# Patient Record
Sex: MALE | Race: AMERICAN INDIAN OR ALASKA NATIVE | NOT HISPANIC OR LATINO | URBAN - METROPOLITAN AREA
[De-identification: names, ages, dates, MRNs, and addresses within clinical notes are randomized per-mention and may not be internally consistent; named-entity substitution may affect disease eponyms.]

---

## 2020-01-06 ENCOUNTER — OUTPATIENT (OUTPATIENT)
Dept: OUTPATIENT SERVICES | Facility: HOSPITAL | Age: 80
LOS: 1 days | End: 2020-01-06
Payer: MEDICAID

## 2020-01-06 DIAGNOSIS — R63.4 ABNORMAL WEIGHT LOSS: ICD-10-CM

## 2020-01-06 DIAGNOSIS — R63.0 ANOREXIA: ICD-10-CM

## 2020-01-06 PROCEDURE — 74240 X-RAY XM UPR GI TRC 1CNTRST: CPT

## 2020-01-06 PROCEDURE — 74240 X-RAY XM UPR GI TRC 1CNTRST: CPT | Mod: 26

## 2020-01-07 DIAGNOSIS — R63.0 ANOREXIA: ICD-10-CM

## 2020-01-07 DIAGNOSIS — R63.4 ABNORMAL WEIGHT LOSS: ICD-10-CM

## 2020-02-14 ENCOUNTER — APPOINTMENT (OUTPATIENT)
Dept: RADIOLOGY | Facility: CLINIC | Age: 80
End: 2020-02-14
Payer: MEDICAID

## 2020-02-14 ENCOUNTER — OUTPATIENT (OUTPATIENT)
Dept: OUTPATIENT SERVICES | Facility: HOSPITAL | Age: 80
LOS: 1 days | End: 2020-02-14
Payer: MEDICAID

## 2020-02-14 ENCOUNTER — APPOINTMENT (OUTPATIENT)
Dept: ULTRASOUND IMAGING | Facility: CLINIC | Age: 80
End: 2020-02-14
Payer: MEDICAID

## 2020-02-14 DIAGNOSIS — Z00.8 ENCOUNTER FOR OTHER GENERAL EXAMINATION: ICD-10-CM

## 2020-02-14 PROCEDURE — 76700 US EXAM ABDOM COMPLETE: CPT

## 2020-02-14 PROCEDURE — 76700 US EXAM ABDOM COMPLETE: CPT | Mod: 26

## 2020-02-14 PROCEDURE — 71046 X-RAY EXAM CHEST 2 VIEWS: CPT | Mod: 26

## 2020-02-14 PROCEDURE — 71046 X-RAY EXAM CHEST 2 VIEWS: CPT

## 2020-02-20 ENCOUNTER — OUTPATIENT (OUTPATIENT)
Dept: OUTPATIENT SERVICES | Facility: HOSPITAL | Age: 80
LOS: 1 days | End: 2020-02-20
Payer: MEDICAID

## 2020-02-20 ENCOUNTER — APPOINTMENT (OUTPATIENT)
Dept: CT IMAGING | Facility: CLINIC | Age: 80
End: 2020-02-20
Payer: MEDICAID

## 2020-02-20 DIAGNOSIS — Z00.8 ENCOUNTER FOR OTHER GENERAL EXAMINATION: ICD-10-CM

## 2020-02-20 DIAGNOSIS — R63.4 ABNORMAL WEIGHT LOSS: ICD-10-CM

## 2020-02-20 DIAGNOSIS — K86.89 OTHER SPECIFIED DISEASES OF PANCREAS: ICD-10-CM

## 2020-02-20 DIAGNOSIS — D49.0 NEOPLASM OF UNSPECIFIED BEHAVIOR OF DIGESTIVE SYSTEM: ICD-10-CM

## 2020-02-20 PROCEDURE — 71260 CT THORAX DX C+: CPT | Mod: 26

## 2020-02-20 PROCEDURE — 74177 CT ABD & PELVIS W/CONTRAST: CPT | Mod: 26

## 2020-02-20 PROCEDURE — 71260 CT THORAX DX C+: CPT

## 2020-02-20 PROCEDURE — 74177 CT ABD & PELVIS W/CONTRAST: CPT

## 2020-02-20 PROCEDURE — 82565 ASSAY OF CREATININE: CPT

## 2020-02-25 ENCOUNTER — APPOINTMENT (OUTPATIENT)
Dept: SURGICAL ONCOLOGY | Facility: CLINIC | Age: 80
End: 2020-02-25
Payer: MEDICAID

## 2020-02-25 VITALS
WEIGHT: 87 LBS | SYSTOLIC BLOOD PRESSURE: 156 MMHG | HEART RATE: 72 BPM | BODY MASS INDEX: 15.41 KG/M2 | HEIGHT: 63 IN | DIASTOLIC BLOOD PRESSURE: 89 MMHG | RESPIRATION RATE: 15 BRPM

## 2020-02-25 DIAGNOSIS — Z87.898 PERSONAL HISTORY OF OTHER SPECIFIED CONDITIONS: ICD-10-CM

## 2020-02-25 DIAGNOSIS — Z86.73 PERSONAL HISTORY OF TRANSIENT ISCHEMIC ATTACK (TIA), AND CEREBRAL INFARCTION W/OUT RESIDUAL DEFICITS: ICD-10-CM

## 2020-02-25 DIAGNOSIS — Z86.39 PERSONAL HISTORY OF OTHER ENDOCRINE, NUTRITIONAL AND METABOLIC DISEASE: ICD-10-CM

## 2020-02-25 DIAGNOSIS — Z78.9 OTHER SPECIFIED HEALTH STATUS: ICD-10-CM

## 2020-02-25 DIAGNOSIS — K31.9 DISEASE OF STOMACH AND DUODENUM, UNSPECIFIED: ICD-10-CM

## 2020-02-25 DIAGNOSIS — K86.89 OTHER SPECIFIED DISEASES OF PANCREAS: ICD-10-CM

## 2020-02-25 PROCEDURE — 99204 OFFICE O/P NEW MOD 45 MIN: CPT

## 2020-02-26 ENCOUNTER — LABORATORY RESULT (OUTPATIENT)
Age: 80
End: 2020-02-26

## 2020-02-26 PROBLEM — Z86.73 HISTORY OF STROKE: Status: RESOLVED | Noted: 2020-02-25 | Resolved: 2020-02-26

## 2020-02-26 PROBLEM — Z78.9 NON-SMOKER: Status: ACTIVE | Noted: 2020-02-25

## 2020-02-26 PROBLEM — Z86.39 HISTORY OF DIABETES MELLITUS: Status: RESOLVED | Noted: 2020-02-25 | Resolved: 2020-02-26

## 2020-02-26 PROBLEM — Z87.898 HISTORY OF WEIGHT LOSS: Status: RESOLVED | Noted: 2020-02-25 | Resolved: 2020-02-26

## 2020-02-26 PROBLEM — K31.9 STOMACH PROBLEMS: Status: RESOLVED | Noted: 2020-02-25 | Resolved: 2020-02-26

## 2020-02-26 RX ORDER — ATENOLOL 50 MG/1
50 TABLET ORAL
Refills: 0 | Status: ACTIVE | COMMUNITY

## 2020-02-26 NOTE — CONSULT LETTER
[Dear  ___] : Dear  [unfilled], [Consult Letter:] : I had the pleasure of evaluating your patient, [unfilled]. [Please see my note below.] : Please see my note below. [Sincerely,] : Sincerely, [Consult Closing:] : Thank you very much for allowing me to participate in the care of this patient.  If you have any questions, please do not hesitate to contact me. [FreeTextEntry2] : Dr. Coates Co [FreeTextEntry3] : Braxton Lazcano\par Mobile [phone]\par Office direct [phone]

## 2020-02-26 NOTE — HISTORY OF PRESENT ILLNESS
[de-identified] : Mr. Parra is an 78 y/o male who recently returned from the [facility].  He reports several months of decreased appetite and severe weight loss of about 30 lbs  He also complains of upper abdominal pain.  He underwent abdominal ultrasound which demonstrated a pancreatic mass, ascites and gallstones.  The liver was noted to be normal on ultrasound.  Subsequent CT chest/abdomen/pelvis 02/20/20 demonstrated a 4.4 cm pancreatic body mass with splenic vein and artery involvement, retroperitoneal adenopathy, ascites and peritoneal nodularity and innumerable liver lesions.  Findings were highly concerning for metastatic pancreatic malignancy.

## 2020-02-26 NOTE — PHYSICAL EXAM
[Normal] : supple, no neck mass and thyroid not enlarged [Normal Neck Lymph Nodes] : normal neck lymph nodes  [Normal Groin Lymph Nodes] : normal groin lymph nodes [Normal Supraclavicular Lymph Nodes] : normal supraclavicular lymph nodes [Normal Axillary Lymph Nodes] : normal axillary lymph nodes [de-identified] : soft, mildly distended, nontender. [Normal] : grossly intact

## 2020-02-26 NOTE — ASSESSMENT
[FreeTextEntry1] : 80 y/o male presents with suspected metastatic pancreatic cancer.\par \par Plan: I reviewed the CT imaging with the patient and his daughter and explained my concern of metastatic pancreatic cancer.  I explained that tissue diagnosis is needed to help direct care.  Should metastatic cancer be confirmed, surgical treatment cannot be offered for cure, but only to palliative symptoms.  Will refer to advanced GI for EUS and biopsy of pancreatic mass/left liver lesion to confirm diagnosis.  Blood work ordered including tumor markers.  All questions answered.

## 2020-02-26 NOTE — REASON FOR VISIT
[Referred By: ___] : Referred By: [unfilled] [Initial Consultation] : an initial consultation for [Other: _____] : [unfilled]

## 2020-02-27 LAB
APTT BLD: 30.4 SEC
BASOPHILS # BLD AUTO: 0.04 K/UL
BASOPHILS NFR BLD AUTO: 0.6 %
CANCER AG19-9 SERPL-ACNC: 5283 U/ML
CEA SERPL-MCNC: 13.7 NG/ML
EOSINOPHIL # BLD AUTO: 0.01 K/UL
EOSINOPHIL NFR BLD AUTO: 0.1 %
HCT VFR BLD CALC: 34.9 %
HGB BLD-MCNC: 11.6 G/DL
IMM GRANULOCYTES NFR BLD AUTO: 0.3 %
INR PPP: 0.93 RATIO
LYMPHOCYTES # BLD AUTO: 0.87 K/UL
LYMPHOCYTES NFR BLD AUTO: 12.3 %
MAN DIFF?: NORMAL
MCHC RBC-ENTMCNC: 33 PG
MCHC RBC-ENTMCNC: 33.2 GM/DL
MCV RBC AUTO: 99.1 FL
MONOCYTES # BLD AUTO: 0.56 K/UL
MONOCYTES NFR BLD AUTO: 7.9 %
NEUTROPHILS # BLD AUTO: 5.55 K/UL
NEUTROPHILS NFR BLD AUTO: 78.8 %
PLATELET # BLD AUTO: 267 K/UL
PT BLD: 10.7 SEC
RBC # BLD: 3.52 M/UL
RBC # FLD: 13.5 %
WBC # FLD AUTO: 7.05 K/UL

## 2020-02-28 LAB — CGA SERPL-MCNC: 115 NG/ML

## 2020-03-05 ENCOUNTER — APPOINTMENT (OUTPATIENT)
Dept: GASTROENTEROLOGY | Facility: HOSPITAL | Age: 80
End: 2020-03-05

## 2020-03-05 ENCOUNTER — OUTPATIENT (OUTPATIENT)
Dept: OUTPATIENT SERVICES | Facility: HOSPITAL | Age: 80
LOS: 1 days | Discharge: HOME | End: 2020-03-05
Payer: MEDICAID

## 2020-03-05 ENCOUNTER — RESULT REVIEW (OUTPATIENT)
Age: 80
End: 2020-03-05

## 2020-03-05 VITALS
RESPIRATION RATE: 14 BRPM | HEART RATE: 73 BPM | SYSTOLIC BLOOD PRESSURE: 126 MMHG | OXYGEN SATURATION: 93 % | HEIGHT: 65 IN | TEMPERATURE: 98 F | WEIGHT: 87.96 LBS | DIASTOLIC BLOOD PRESSURE: 87 MMHG

## 2020-03-05 VITALS — HEART RATE: 74 BPM | SYSTOLIC BLOOD PRESSURE: 121 MMHG | RESPIRATION RATE: 11 BRPM | DIASTOLIC BLOOD PRESSURE: 74 MMHG

## 2020-03-05 DIAGNOSIS — K86.89 OTHER SPECIFIED DISEASES OF PANCREAS: ICD-10-CM

## 2020-03-05 PROCEDURE — 43242 EGD US FINE NEEDLE BX/ASPIR: CPT | Mod: GC

## 2020-03-05 PROCEDURE — 88307 TISSUE EXAM BY PATHOLOGIST: CPT | Mod: 26

## 2020-03-05 RX ORDER — METFORMIN HYDROCHLORIDE 850 MG/1
1 TABLET ORAL
Qty: 0 | Refills: 0 | DISCHARGE

## 2020-03-05 RX ORDER — SODIUM CHLORIDE 9 MG/ML
1000 INJECTION INTRAMUSCULAR; INTRAVENOUS; SUBCUTANEOUS
Refills: 0 | Status: DISCONTINUED | OUTPATIENT
Start: 2020-03-05 | End: 2020-03-20

## 2020-03-05 RX ORDER — ATENOLOL 25 MG/1
1 TABLET ORAL
Qty: 0 | Refills: 0 | DISCHARGE

## 2020-03-05 RX ORDER — ASPIRIN/CALCIUM CARB/MAGNESIUM 324 MG
1 TABLET ORAL
Qty: 0 | Refills: 0 | DISCHARGE

## 2020-03-05 RX ADMIN — SODIUM CHLORIDE 75 MILLILITER(S): 9 INJECTION INTRAMUSCULAR; INTRAVENOUS; SUBCUTANEOUS at 09:34

## 2020-03-11 ENCOUNTER — APPOINTMENT (OUTPATIENT)
Dept: HEMATOLOGY ONCOLOGY | Facility: CLINIC | Age: 80
End: 2020-03-11
Payer: MEDICAID

## 2020-03-11 ENCOUNTER — OUTPATIENT (OUTPATIENT)
Dept: OUTPATIENT SERVICES | Facility: HOSPITAL | Age: 80
LOS: 1 days | Discharge: HOME | End: 2020-03-11

## 2020-03-11 VITALS
RESPIRATION RATE: 16 BRPM | HEART RATE: 102 BPM | DIASTOLIC BLOOD PRESSURE: 86 MMHG | TEMPERATURE: 98.1 F | SYSTOLIC BLOOD PRESSURE: 133 MMHG | BODY MASS INDEX: 15.77 KG/M2 | HEIGHT: 63 IN | WEIGHT: 89 LBS

## 2020-03-11 DIAGNOSIS — R64 CACHEXIA: ICD-10-CM

## 2020-03-11 DIAGNOSIS — Z80.1 FAMILY HISTORY OF MALIGNANT NEOPLASM OF TRACHEA, BRONCHUS AND LUNG: ICD-10-CM

## 2020-03-11 DIAGNOSIS — G47.00 INSOMNIA, UNSPECIFIED: ICD-10-CM

## 2020-03-11 DIAGNOSIS — C25.9 MALIGNANT NEOPLASM OF PANCREAS, UNSPECIFIED: ICD-10-CM

## 2020-03-11 DIAGNOSIS — K86.89 OTHER SPECIFIED DISEASES OF PANCREAS: ICD-10-CM

## 2020-03-11 PROBLEM — E11.9 TYPE 2 DIABETES MELLITUS WITHOUT COMPLICATIONS: Chronic | Status: ACTIVE | Noted: 2020-03-05

## 2020-03-11 PROBLEM — I63.9 CEREBRAL INFARCTION, UNSPECIFIED: Chronic | Status: ACTIVE | Noted: 2020-03-05

## 2020-03-11 PROBLEM — I10 ESSENTIAL (PRIMARY) HYPERTENSION: Chronic | Status: ACTIVE | Noted: 2020-03-05

## 2020-03-11 PROBLEM — E78.5 HYPERLIPIDEMIA, UNSPECIFIED: Chronic | Status: ACTIVE | Noted: 2020-03-05

## 2020-03-11 PROCEDURE — 99205 OFFICE O/P NEW HI 60 MIN: CPT

## 2020-03-11 RX ORDER — METFORMIN HYDROCHLORIDE 625 MG/1
TABLET ORAL
Refills: 0 | Status: DISCONTINUED | COMMUNITY
End: 2020-03-11

## 2020-03-11 RX ORDER — ATORVASTATIN CALCIUM 80 MG/1
TABLET, FILM COATED ORAL
Refills: 0 | Status: DISCONTINUED | COMMUNITY
End: 2020-03-11

## 2020-03-11 RX ORDER — PANTOPRAZOLE 40 MG/1
40 TABLET, DELAYED RELEASE ORAL DAILY
Qty: 90 | Refills: 1 | Status: ACTIVE | COMMUNITY
Start: 2020-03-11 | End: 1900-01-01

## 2020-03-11 RX ORDER — METFORMIN ER 500 MG 500 MG/1
500 TABLET ORAL
Refills: 0 | Status: ACTIVE | COMMUNITY
Start: 2020-03-11

## 2020-03-11 NOTE — CONSULT LETTER
[Dear  ___] : Dear  [unfilled], [( Thank you for referring [unfilled] for consultation for _____ )] : Thank you for referring [unfilled] for consultation for [unfilled] [Please see my note below.] : Please see my note below. [Consult Closing:] : Thank you very much for allowing me to participate in the care of this patient.  If you have any questions, please do not hesitate to contact me. [Sincerely,] : Sincerely, [FreeTextEntry2] : Dr Coates Co  [FreeTextEntry3] : Katherine Harvey MD

## 2020-03-11 NOTE — HISTORY OF PRESENT ILLNESS
[Disease: _____________________] : Disease: [unfilled] [M: ___] : M[unfilled] [AJCC Stage: ____] : AJCC Stage: [unfilled] [de-identified] : Mr. Parra is an 80 y/o male who recently returned from the [facility].  He reports several months of decreased appetite and severe weight loss of about 30 lbs  He also complains of upper abdominal pain.  He underwent abdominal ultrasound which demonstrated a pancreatic mass, ascites and gallstones.  The liver was noted to be normal on ultrasound.  Subsequent CT chest/abdomen/pelvis 02/20/20 demonstrated a 4.4 cm pancreatic body mass with splenic vein and artery involvement, retroperitoneal adenopathy, ascites and peritoneal nodularity and innumerable liver lesions.  Findings were highly concerning for metastatic pancreatic malignancy.  CT scan of chest showed bilateral lung mets. \par IR biopsy pancreatic mass- adenocarcinoma. Liver biopsy metastatic adenocarcinoma  morphologically similar to pancreatic biopsy. \par He is here with his daughter Iveth to discuss diagnosis/ treatment options.\par \par He  c/o anorexia for several months- lost about 30 lbs. Occasional mild epigastric pain- tried TUMS before and it helped. Does not want/ does not need any pain meds now. no diarrhea or constipation. no bloating. No pulmonary complaints. Generalized weakness. Uses walker at home but can walk without support on short distances. One of his main c/o is severe insomnia. \par \par He lives here with his daughter. His son  and extensive family are in [facility]. \par  [de-identified] : liver, lung  mets , abdominal adenopathy , peritoneal mets

## 2020-03-11 NOTE — ASSESSMENT
[FreeTextEntry1] : 80 y/o frail elderly  male diagnosed with very advanced pancreatic cancer- extensive metastatic disease  - liver, pulmonary mets, adenopathy, peritoneal carcinomatosis.\par Symptomatic with weight loss/ cachexia/ weakness  but has no significant pain.\par Performance status 2-3.\par Discussed option of palliative chemotherapy with gemcitabine- but in view of his age , cachexia and poor performance status- I am afraid that risk of toxicity is high and  chance that chemotherapy would improve his quality of life are very small. \par Patient does not desire any cancer treatment and his main goal is to be comfortable at home. Discussed / recommended Home Hospice and he and his daughter would like to go ahead with that.\par \par Discussed option of genetic testing to r/o hereditary cancer syndrome ( per NCCN recommendations ) - will not change his management but might be important for his family. He wanted to do testing- done today.\par Rx Protonix .\par Rx low dose temazepam for insomnia\par Home Hospice referral.\par RV PRN.\par \par D/w patient and his daughter. \par \par \par  Statement Selected

## 2020-03-11 NOTE — PHYSICAL EXAM
[Ambulatory and capable of all self care but unable to carry out any work activities] : Status 2- Ambulatory and capable of all self care but unable to carry out any work activities. Up and about more than 50% of waking hours [Thin] : thin [Normal] : affect appropriate [de-identified] : cachectic  elderly male  [de-identified] : no leg edema [de-identified] : soft, no ascites, slight epigastric tenderness [de-identified] : walks slowly without support

## 2020-03-11 NOTE — REVIEW OF SYSTEMS
[Patient Intake Form Reviewed] : Patient intake form was reviewed [Fatigue] : fatigue [Recent Change In Weight] : ~T recent weight change [Muscle Weakness] : muscle weakness [Insomnia] : insomnia [Negative] : Genitourinary [FreeTextEntry7] : per HPI  [de-identified] : per HPI

## 2020-03-12 DIAGNOSIS — C25.9 MALIGNANT NEOPLASM OF PANCREAS, UNSPECIFIED: ICD-10-CM

## 2020-03-13 RX ORDER — TEMAZEPAM 7.5 MG/1
7.5 CAPSULE ORAL
Qty: 30 | Refills: 5 | Status: ACTIVE | COMMUNITY
Start: 2020-03-11 | End: 1900-01-01

## 2021-08-06 NOTE — ASU PREOP CHECKLIST - HEIGHT IN FEET
-- DO NOT REPLY / DO NOT REPLY ALL --  -- Message is from the [facility]--    General Patient Message      Reason for Call:  A fax was sent to office on 8/3 for patient abnormal Mri report and requesting a soon appt for New  patient.  Requesting a call back regarding scheduling     Caller Information       Type Contact Phone    08/06/2021 03:58 PM CDT Phone (Incoming) Shahid  (Other) [phone]     [facility]           Alternative phone number: NA    Turnaround time given to caller:   \"This message will be sent to [state Provider's name]. The clinical team will fulfill your request as soon as they review your message.\"    
Spoke with [facility] and informed them that Dr. Arnett is booked out and we recommend that the patient be seen by someone a little sooner that what he can accommodate. Contact information was given for [facility] at [place], who could further assist with scheduling with the other providers. Referral has been scanned and attached in the patient's record.  
5

## 2023-10-01 PROBLEM — K86.89 MASS OF PANCREAS: Status: ACTIVE | Noted: 2020-02-25
